# Patient Record
Sex: FEMALE | Race: WHITE | NOT HISPANIC OR LATINO | Employment: FULL TIME | URBAN - METROPOLITAN AREA
[De-identification: names, ages, dates, MRNs, and addresses within clinical notes are randomized per-mention and may not be internally consistent; named-entity substitution may affect disease eponyms.]

---

## 2017-06-17 ENCOUNTER — HISTORICAL (OUTPATIENT)
Dept: ADMINISTRATIVE | Facility: HOSPITAL | Age: 32
End: 2017-06-17

## 2022-04-07 ENCOUNTER — HISTORICAL (OUTPATIENT)
Dept: ADMINISTRATIVE | Facility: HOSPITAL | Age: 37
End: 2022-04-07

## 2022-04-23 VITALS
BODY MASS INDEX: 22.6 KG/M2 | HEIGHT: 63 IN | DIASTOLIC BLOOD PRESSURE: 67 MMHG | WEIGHT: 127.56 LBS | SYSTOLIC BLOOD PRESSURE: 100 MMHG | OXYGEN SATURATION: 97 %

## 2022-06-15 ENCOUNTER — OFFICE VISIT (OUTPATIENT)
Dept: URGENT CARE | Facility: CLINIC | Age: 37
End: 2022-06-15
Payer: COMMERCIAL

## 2022-06-15 ENCOUNTER — TELEPHONE (OUTPATIENT)
Dept: URGENT CARE | Facility: CLINIC | Age: 37
End: 2022-06-15

## 2022-06-15 VITALS
BODY MASS INDEX: 23.6 KG/M2 | HEIGHT: 63 IN | RESPIRATION RATE: 20 BRPM | DIASTOLIC BLOOD PRESSURE: 78 MMHG | OXYGEN SATURATION: 97 % | WEIGHT: 133.19 LBS | SYSTOLIC BLOOD PRESSURE: 115 MMHG | HEART RATE: 79 BPM | TEMPERATURE: 98 F

## 2022-06-15 DIAGNOSIS — R05.9 COUGH: ICD-10-CM

## 2022-06-15 DIAGNOSIS — J40 BRONCHITIS: Primary | ICD-10-CM

## 2022-06-15 PROCEDURE — 99203 PR OFFICE/OUTPT VISIT, NEW, LEVL III, 30-44 MIN: ICD-10-PCS | Mod: ,,, | Performed by: PHYSICIAN ASSISTANT

## 2022-06-15 PROCEDURE — 1159F MED LIST DOCD IN RCRD: CPT | Mod: CPTII,,, | Performed by: PHYSICIAN ASSISTANT

## 2022-06-15 PROCEDURE — 3008F PR BODY MASS INDEX (BMI) DOCUMENTED: ICD-10-PCS | Mod: CPTII,,, | Performed by: PHYSICIAN ASSISTANT

## 2022-06-15 PROCEDURE — 3078F DIAST BP <80 MM HG: CPT | Mod: CPTII,,, | Performed by: PHYSICIAN ASSISTANT

## 2022-06-15 PROCEDURE — 3074F SYST BP LT 130 MM HG: CPT | Mod: CPTII,,, | Performed by: PHYSICIAN ASSISTANT

## 2022-06-15 PROCEDURE — 1159F PR MEDICATION LIST DOCUMENTED IN MEDICAL RECORD: ICD-10-PCS | Mod: CPTII,,, | Performed by: PHYSICIAN ASSISTANT

## 2022-06-15 PROCEDURE — 1160F RVW MEDS BY RX/DR IN RCRD: CPT | Mod: CPTII,,, | Performed by: PHYSICIAN ASSISTANT

## 2022-06-15 PROCEDURE — 3008F BODY MASS INDEX DOCD: CPT | Mod: CPTII,,, | Performed by: PHYSICIAN ASSISTANT

## 2022-06-15 PROCEDURE — 1160F PR REVIEW ALL MEDS BY PRESCRIBER/CLIN PHARMACIST DOCUMENTED: ICD-10-PCS | Mod: CPTII,,, | Performed by: PHYSICIAN ASSISTANT

## 2022-06-15 PROCEDURE — 3078F PR MOST RECENT DIASTOLIC BLOOD PRESSURE < 80 MM HG: ICD-10-PCS | Mod: CPTII,,, | Performed by: PHYSICIAN ASSISTANT

## 2022-06-15 PROCEDURE — 3074F PR MOST RECENT SYSTOLIC BLOOD PRESSURE < 130 MM HG: ICD-10-PCS | Mod: CPTII,,, | Performed by: PHYSICIAN ASSISTANT

## 2022-06-15 PROCEDURE — 99203 OFFICE O/P NEW LOW 30 MIN: CPT | Mod: ,,, | Performed by: PHYSICIAN ASSISTANT

## 2022-06-15 RX ORDER — ALBUTEROL SULFATE 90 UG/1
2 AEROSOL, METERED RESPIRATORY (INHALATION) EVERY 6 HOURS PRN
Qty: 6.7 G | Refills: 0 | Status: SHIPPED | OUTPATIENT
Start: 2022-06-15 | End: 2022-06-25

## 2022-06-15 RX ORDER — AZITHROMYCIN 250 MG/1
TABLET, FILM COATED ORAL
Qty: 6 TABLET | Refills: 0 | Status: SHIPPED | OUTPATIENT
Start: 2022-06-15 | End: 2022-06-20

## 2022-06-15 NOTE — PROGRESS NOTES
"Subjective:       Patient ID: Krystle Fallon is a 36 y.o. female.    Vitals:  vitals were not taken for this visit.     Chief Complaint: Cough (Cough, headache, sinus pressure, "crackling" feeling in left side of chest, home COVID test positive a week ago)    Cough, headache, sinus pressure, "crackling" feeling in chest, positive COVID home test a week ago    Cough  This is a new problem. The current episode started in the past 7 days. The problem has been gradually worsening. The problem occurs constantly. The cough is productive of sputum. Associated symptoms include headaches. Treatments tried: mucinex, vitamins C E and Zinc. The treatment provided mild relief.       Respiratory: Positive for cough.    Neurological: Positive for headaches.       Objective:      Physical Exam      Assessment:       No diagnosis found.      Plan:         There are no diagnoses linked to this encounter.               "

## 2022-06-15 NOTE — PATIENT INSTRUCTIONS
Monitor symptoms closely.  If you start to experience chest pain, shortness a breath, worsening cough, worsening wheezing, fever, chills, myalgias, or any new/worsening/persistent symptoms he should go to ER immediately for further evaluation.  Follow-up with your PCP within 72 hours.  According to my read chest x-ray showed no acute cardiopulmonary process although will call with radiology read once results are in.

## 2022-06-15 NOTE — TELEPHONE ENCOUNTER
----- Message from ABHAY Villela sent at 6/15/2022 10:09 AM CDT -----  CXR shows no acute abnormality. Please call patient with results. Follow up as discussed in clinic.

## 2022-06-15 NOTE — PROGRESS NOTES
"  History of Presenting Illness     Patient ID: Krystle Fallon     Chief Complaint: Cough (Cough, headache, sinus pressure, "crackling" feeling in left side of chest, home COVID test positive a week ago)    HPI:  Patient is a 36 y.o. year old female who presents to urgent care with complaints of cough, headache, and sinus pressure for the past eight days.  Patient states symptoms started eight days ago and roughly 24 hours after her symptom onset she had a positive home COVID test.  Patient states symptoms started with pressure in the frontal maxillary sinuses as well as postnasal drip and a nonproductive cough.  Patient states over the past couple of days she has felt a crackling sensation on the left side of her chest with coughing.  Patient denies any associated shortness of breath, trouble breathing, wheezing, chest pain, palpitations, pleuritic pain, or lower extremity swelling.  Patient states she had fevers the 1st 48 hours of symptoms but denies any fever since.  Overall she is feeling better although decided to be seen in urgent care she was concerned of the crackling sensation she is feeling in her chest.  Otherwise no nausea, vomiting, diarrhea, abdominal pain, rashes, or neck stiffness.  She has been taking Mucinex, vitamin-C, vitamin-E, and zinc.    Review of Systems:  General: Denies fever, chills, fatigue, myalgias, and change in appetite   Eyes: Denies change in vision, eye redness, eye drainage, eye pain  ENT: See above   Resp: Denies wheezing, and shortness of breath   Cardio: Denies chest pain, palpitations, pleuritic pain, and edema   GI: Denies nausea, vomiting, diarrhea, and abdominal pain   MSK: Denies trauma, joint pain, and trouble ambulating   Neuro: Denies LOC, dizziness, seizure like activity, and focal deficits   Skin: Denies rashes, open lesions and ulcers     Previous History     Review of patient's allergies indicates:   Allergen Reactions    Keflex [cephalexin] Shortness Of " "Breath    Penicillins Rash     Past Medical History:   Diagnosis Date    Anxiety     Endometriosis      Current Outpatient Medications   Medication Instructions    albuterol (VENTOLIN HFA) 90 mcg/actuation inhaler 2 puffs, Inhalation, Every 6 hours PRN, Rescue    azithromycin (Z-DANIEL) 250 MG tablet Take 2 tablets by mouth on day 1; Take 1 tablet by mouth on days 2-5     Past Surgical History:   Procedure Laterality Date     SECTION  10/2009    DILATION AND CURETTAGE OF UTERUS  2013    LAPAROSCOPY  2012    SURGICAL REMOVAL OF ENDOMETRIOSIS  2014    WISDOM TOOTH EXTRACTION  2004     Family History   Problem Relation Age of Onset    Leukemia Sister      Physical Exam      Vital Signs Reviewed   /78   Pulse 79   Temp 98.1 °F (36.7 °C) (Oral)   Resp 20   Ht 5' 3" (1.6 m)   Wt 60.4 kg (133 lb 3.2 oz)   LMP 06/10/2022 (Exact Date)   SpO2 97%   BMI 23.60 kg/m²     Physical Exam:  General: Well developed, well nourished, awake and alert. No acute distress.   Eye: PERRLA, EOMI, no scleral icterus, clear conjunctiva, eyelids normal.  Ear: No tragal tenderness. Ear canal patent. TMs intact bilaterally without erythema and with a normal light reflex.   Mouth: Oropharynx with mild erythema and PND. No exudates, ulcerations, or lesions. 2+ tonsillar swelling.   Neck: No palpable lymphadenopathy, trachea midline, no visible thyromegaly.   Respiratory: Clear to auscultation anteriorly and posteriorly throughout all lung fields bilaterally, normal respiratory rate and inspiratory effort.   Cardiovascular: RRR w/o murmurs, no LE edema.   Musculoskeletal: Normal gait. No joint swelling.   Integumentary: No rashes or skin lesions noted. No cyanosis or jaundice.   Neurologic: Facial expressions even, CN1-12 grossly intact, speech is clear, cognition in tact.     Imaging    CXR shows no acute cardiopulmonary process per my read. Still awaiting radiology read.     Assessment        1. Bronchitis  "   2. Cough        Plan        Delsym for cough.  Rescue inhaler as needed.  Azithromycin as prescribed and as discussed.  Gave patient strict ER precautions.  Discussed if she starts to experience shortness of breath, chest pain, worsening cough, fever, chills, myalgias, runny new, worsening, or persistent symptoms she should go to ER immediately for further evaluation.  Patient expressed understanding.    Orders Placed This Encounter    XR CHEST PA AND LATERAL    albuterol (VENTOLIN HFA) 90 mcg/actuation inhaler    azithromycin (Z-DANIEL) 250 MG tablet      Kalyn Degroot PA-C    No future appointments.

## 2022-06-16 ENCOUNTER — TELEPHONE (OUTPATIENT)
Dept: URGENT CARE | Facility: CLINIC | Age: 37
End: 2022-06-16
Payer: COMMERCIAL

## 2022-07-21 ENCOUNTER — OFFICE VISIT (OUTPATIENT)
Dept: URGENT CARE | Facility: CLINIC | Age: 37
End: 2022-07-21
Payer: COMMERCIAL

## 2022-07-21 VITALS
HEART RATE: 78 BPM | WEIGHT: 133 LBS | DIASTOLIC BLOOD PRESSURE: 78 MMHG | SYSTOLIC BLOOD PRESSURE: 111 MMHG | HEIGHT: 63 IN | OXYGEN SATURATION: 98 % | TEMPERATURE: 98 F | BODY MASS INDEX: 23.57 KG/M2 | RESPIRATION RATE: 20 BRPM

## 2022-07-21 DIAGNOSIS — L08.9 STAPH SKIN INFECTION: Primary | ICD-10-CM

## 2022-07-21 DIAGNOSIS — B95.8 STAPH SKIN INFECTION: Primary | ICD-10-CM

## 2022-07-21 PROCEDURE — 99202 PR OFFICE/OUTPT VISIT, NEW, LEVL II, 15-29 MIN: ICD-10-PCS | Mod: ,,, | Performed by: REGISTERED NURSE

## 2022-07-21 PROCEDURE — 3078F DIAST BP <80 MM HG: CPT | Mod: CPTII,,, | Performed by: REGISTERED NURSE

## 2022-07-21 PROCEDURE — 3008F BODY MASS INDEX DOCD: CPT | Mod: CPTII,,, | Performed by: REGISTERED NURSE

## 2022-07-21 PROCEDURE — 3074F PR MOST RECENT SYSTOLIC BLOOD PRESSURE < 130 MM HG: ICD-10-PCS | Mod: CPTII,,, | Performed by: REGISTERED NURSE

## 2022-07-21 PROCEDURE — 99202 OFFICE O/P NEW SF 15 MIN: CPT | Mod: ,,, | Performed by: REGISTERED NURSE

## 2022-07-21 PROCEDURE — 3078F PR MOST RECENT DIASTOLIC BLOOD PRESSURE < 80 MM HG: ICD-10-PCS | Mod: CPTII,,, | Performed by: REGISTERED NURSE

## 2022-07-21 PROCEDURE — 3074F SYST BP LT 130 MM HG: CPT | Mod: CPTII,,, | Performed by: REGISTERED NURSE

## 2022-07-21 PROCEDURE — 1159F MED LIST DOCD IN RCRD: CPT | Mod: CPTII,,, | Performed by: REGISTERED NURSE

## 2022-07-21 PROCEDURE — 3008F PR BODY MASS INDEX (BMI) DOCUMENTED: ICD-10-PCS | Mod: CPTII,,, | Performed by: REGISTERED NURSE

## 2022-07-21 PROCEDURE — 1159F PR MEDICATION LIST DOCUMENTED IN MEDICAL RECORD: ICD-10-PCS | Mod: CPTII,,, | Performed by: REGISTERED NURSE

## 2022-07-21 RX ORDER — SULFAMETHOXAZOLE AND TRIMETHOPRIM 800; 160 MG/1; MG/1
1 TABLET ORAL 2 TIMES DAILY
Qty: 14 TABLET | Refills: 0 | Status: SHIPPED | OUTPATIENT
Start: 2022-07-21 | End: 2022-07-28

## 2022-07-21 NOTE — PATIENT INSTRUCTIONS
Bactrim DS 1 tab b.i.d. x7 days sent to pharmacy on file  Keep area clean and dry.  Apply warm compresses to the area 3-4 times per day.  Follow-up with any questions or concerns  Go to the emergency department with any significant changes or worsening of symptoms.

## 2022-07-21 NOTE — PROGRESS NOTES
"Subjective:       Patient ID: Krystle Fallon is a 36 y.o. female.    Vitals:  height is 5' 3" (1.6 m) and weight is 60.3 kg (133 lb). Her oral temperature is 98.4 °F (36.9 °C). Her blood pressure is 111/78 and her pulse is 78. Her respiration is 20 and oxygen saturation is 98%.     Chief Complaint: Skin Problem (Small bump on left anterior neck with redness since yesterday)    Small bump on left anterior neck with redness since yesterday        Constitution: Negative. Negative for chills, fatigue and fever.   HENT: Negative.    Neck: neck negative.   Cardiovascular: Negative.  Negative for chest pain and sob on exertion.   Eyes: Negative.    Respiratory: Negative.  Negative for cough and shortness of breath.    Gastrointestinal: Negative.    Endocrine: negative.   Genitourinary: Negative.    Musculoskeletal: Negative.    Skin: Positive for erythema and abscess.   Allergic/Immunologic: Negative.    Neurological: Negative.    Hematologic/Lymphatic: Negative.    Psychiatric/Behavioral: Negative.        Objective:      Physical Exam   HENT:   Head: Normocephalic.   Ears:   Right Ear: Tympanic membrane normal.   Left Ear: Tympanic membrane normal.   Nose: Nose normal.   Mouth/Throat: Mucous membranes are moist.   Eyes: Pupils are equal, round, and reactive to light.   Neck: Neck supple.   Cardiovascular: Normal rate and normal pulses.   Pulmonary/Chest: Effort normal.   Abdominal: Normal appearance. Soft. flat abdomen   Musculoskeletal: Normal range of motion.         General: Normal range of motion.   Neurological: She is alert.   Skin: Skin is warm, dry and abscessed. Capillary refill takes less than 2 seconds. erythema              Assessment:       No diagnosis found.      Plan:   Bactrim DS 1 tab b.i.d. x7 days sent to pharmacy on file  Keep area clean and dry.  Apply warm compresses to the area 3-4 times per day.  Follow-up with any questions or concerns  Go to the emergency department with any significant " changes or worsening of symptoms.  There are no diagnoses linked to this encounter.

## 2023-08-17 ENCOUNTER — LAB VISIT (OUTPATIENT)
Dept: LAB | Facility: HOSPITAL | Age: 38
End: 2023-08-17
Attending: OBSTETRICS & GYNECOLOGY
Payer: COMMERCIAL

## 2023-08-17 DIAGNOSIS — R53.83 FATIGUE, UNSPECIFIED TYPE: Primary | ICD-10-CM

## 2023-08-17 DIAGNOSIS — Z00.00 ROUTINE GENERAL MEDICAL EXAMINATION AT A HEALTH CARE FACILITY: ICD-10-CM

## 2023-08-17 LAB
T4 FREE SERPL-MCNC: 1.1 NG/DL (ref 0.7–1.48)
TSH SERPL-ACNC: 1.51 UIU/ML (ref 0.35–4.94)

## 2023-08-17 PROCEDURE — 84443 ASSAY THYROID STIM HORMONE: CPT

## 2023-08-17 PROCEDURE — 84439 ASSAY OF FREE THYROXINE: CPT

## 2023-08-17 PROCEDURE — 36415 COLL VENOUS BLD VENIPUNCTURE: CPT
